# Patient Record
Sex: FEMALE | Race: ASIAN | Employment: UNEMPLOYED | URBAN - METROPOLITAN AREA
[De-identification: names, ages, dates, MRNs, and addresses within clinical notes are randomized per-mention and may not be internally consistent; named-entity substitution may affect disease eponyms.]

---

## 2022-10-04 ENCOUNTER — HOSPITAL ENCOUNTER (EMERGENCY)
Age: 62
Discharge: HOME | End: 2022-10-04
Attending: EMERGENCY MEDICINE | Admitting: EMERGENCY MEDICINE

## 2022-10-04 ENCOUNTER — APPOINTMENT (OUTPATIENT)
Dept: GENERAL RADIOLOGY | Age: 62
End: 2022-10-04
Attending: EMERGENCY MEDICINE

## 2022-10-04 VITALS
RESPIRATION RATE: 16 BRPM | SYSTOLIC BLOOD PRESSURE: 144 MMHG | DIASTOLIC BLOOD PRESSURE: 89 MMHG | TEMPERATURE: 98.3 F | HEART RATE: 88 BPM | OXYGEN SATURATION: 96 %

## 2022-10-04 DIAGNOSIS — J06.9 VIRAL UPPER RESPIRATORY TRACT INFECTION: Primary | ICD-10-CM

## 2022-10-04 LAB
SARS-COV-2, COV2: NORMAL
SARS-COV-2, XPLCVT: NOT DETECTED
SOURCE, COVRS: NORMAL

## 2022-10-04 PROCEDURE — 99284 EMERGENCY DEPT VISIT MOD MDM: CPT | Performed by: EMERGENCY MEDICINE

## 2022-10-04 PROCEDURE — U0005 INFEC AGEN DETEC AMPLI PROBE: HCPCS

## 2022-10-04 PROCEDURE — 71045 X-RAY EXAM CHEST 1 VIEW: CPT

## 2022-10-04 NOTE — ED PROVIDER NOTES
The history is provided by the patient. The history is limited by a language barrier. A  was used. Cough  This is a new problem. The current episode started yesterday. The problem occurs hourly. The problem has not changed since onset. The cough is Non-productive. There has been no fever. Associated symptoms include myalgias. Pertinent negatives include no chest pain, no chills, no sweats, no weight loss, no eye redness, no ear congestion, no ear pain, no headaches, no rhinorrhea, no sore throat, no shortness of breath, no wheezing, no nausea, no vomiting and no confusion. She has tried nothing for the symptoms. The treatment provided no relief. She is not a smoker. Her past medical history does not include bronchitis, pneumonia, bronchiectasis, COPD, emphysema, asthma, cancer, heart failure or CHF. No past medical history on file. No past surgical history on file. No family history on file. Social History     Socioeconomic History    Marital status: Not on file     Spouse name: Not on file    Number of children: Not on file    Years of education: Not on file    Highest education level: Not on file   Occupational History    Not on file   Tobacco Use    Smoking status: Not on file    Smokeless tobacco: Not on file   Substance and Sexual Activity    Alcohol use: Not on file    Drug use: Not on file    Sexual activity: Not on file   Other Topics Concern    Not on file   Social History Narrative    Not on file     Social Determinants of Health     Financial Resource Strain: Not on file   Food Insecurity: Not on file   Transportation Needs: Not on file   Physical Activity: Not on file   Stress: Not on file   Social Connections: Not on file   Intimate Partner Violence: Not on file   Housing Stability: Not on file         ALLERGIES: Penicillins and Aspirin    Review of Systems   Constitutional:  Negative for activity change, chills, fever and weight loss.    HENT:  Negative for ear pain, nosebleeds, rhinorrhea, sore throat, trouble swallowing and voice change. Eyes:  Negative for redness and visual disturbance. Respiratory:  Positive for cough. Negative for shortness of breath and wheezing. Cardiovascular:  Negative for chest pain and palpitations. Gastrointestinal:  Negative for abdominal pain, constipation, diarrhea, nausea and vomiting. Genitourinary:  Negative for difficulty urinating, dysuria, hematuria and urgency. Musculoskeletal:  Positive for myalgias. Negative for back pain, neck pain and neck stiffness. Skin:  Negative for color change. Allergic/Immunologic: Negative for immunocompromised state. Neurological:  Negative for dizziness, seizures, syncope, weakness, light-headedness, numbness and headaches. Psychiatric/Behavioral:  Negative for behavioral problems, confusion, hallucinations, self-injury and suicidal ideas. Vitals:    10/04/22 0509   BP: (!) 144/89   Pulse: 88   Resp: 16   Temp: 98.3 °F (36.8 °C)   SpO2: 96%            Physical Exam  Vitals and nursing note reviewed. Constitutional:       General: She is not in acute distress. Appearance: She is well-developed. She is not diaphoretic. HENT:      Head: Atraumatic. Neck:      Trachea: No tracheal deviation. Cardiovascular:      Comments: Warm and well perfused  Pulmonary:      Effort: Pulmonary effort is normal. No tachypnea, accessory muscle usage, prolonged expiration or respiratory distress. Breath sounds: Normal breath sounds. Musculoskeletal:         General: Normal range of motion. Skin:     General: Skin is warm and dry. Neurological:      Mental Status: She is alert. Coordination: Coordination normal.   Psychiatric:         Behavior: Behavior normal.         Thought Content:  Thought content normal.         Judgment: Judgment normal.        MDM    This is a 61-year-old female with past medical history, review of systems, physical exam as above, presenting with complaints of myalgias, cough, concern for COVID-19. Patient states she is symptoms began approximately 12 hours ago, states that she recently had close contact with someone else diagnosed with COVID. She denies medical history or daily medications, states she has been vaccinated with \"4 shots\". Interview assisted by elana delgadillo. She denies fevers, chills, chest pain, shortness of breath, nausea or vomiting, sore throat. Physical exam is remarkable for well-appearing middle-aged female, in no acute distress noted to be mildly hypertensive, afebrile without tachycardia, satting well on room air. She has clear breath sounds auscultation, regular rate and rhythm without murmurs gallops or rubs. Discussed with patient reassuring vital signs and exam.  Will send for chest x-ray, COVID PCR though I advised her we may be testing too early. Will advise primary care follow-up, return precautions given.     Procedures

## 2022-10-04 NOTE — ED NOTES
Discharge instructions given to patient with interpretor #[id number]. The patient verbalized understanding.